# Patient Record
Sex: FEMALE | Race: WHITE | NOT HISPANIC OR LATINO | URBAN - METROPOLITAN AREA
[De-identification: names, ages, dates, MRNs, and addresses within clinical notes are randomized per-mention and may not be internally consistent; named-entity substitution may affect disease eponyms.]

---

## 2023-03-08 ENCOUNTER — TELEPHONE ENCOUNTER (OUTPATIENT)
Dept: URBAN - METROPOLITAN AREA CLINIC 68 | Facility: CLINIC | Age: 73
End: 2023-03-08

## 2023-03-09 ENCOUNTER — OFFICE VISIT (OUTPATIENT)
Dept: URBAN - METROPOLITAN AREA CLINIC 66 | Facility: CLINIC | Age: 73
End: 2023-03-09

## 2023-03-09 RX ORDER — SOD SULF/POT CHLORIDE/MAG SULF 1.479 G
AS DIRECTED TABLET ORAL ONCE
Qty: 1 KIT | OUTPATIENT

## 2023-03-09 RX ORDER — CHOLECALCIFEROL (VITAMIN D3) 50 MCG
1 TABLET TABLET ORAL ONCE A DAY
Status: ACTIVE | COMMUNITY

## 2023-03-09 RX ORDER — MULTIVITAMIN WITH IRON
AS DIRECTED TABLET ORAL
Status: ACTIVE | COMMUNITY

## 2023-03-09 RX ORDER — HYDROCHLOROTHIAZIDE 25 MG/1
1 TABLET IN THE MORNING TABLET ORAL ONCE A DAY
Status: ACTIVE | COMMUNITY

## 2023-03-09 RX ORDER — UBIDECARENONE 200 MG
AS DIRECTED CAPSULE ORAL
Status: ACTIVE | COMMUNITY

## 2023-03-09 RX ORDER — SIMVASTATIN 20 MG
1 TABLET IN THE EVENING TABLET ORAL ONCE A DAY
Status: ACTIVE | COMMUNITY

## 2023-03-09 NOTE — EXAM-MIGRATED EXAMINATIONS
At [facility], one important tool we use to improve our patient services is our Patient Survey.  Following your visit you may receive our survey in the mail.    Please take the time to complete the survey.    If your visit with us was great, we want to hear about it.    If we can improve, please let us know how.          General Examination: Extremities: -> normal extremity with no clubbing, cyanosis or edema   Neurologic: -> alert and oriented   Neck / thyroid: -> neck is supple, with full range of motion and no cervical lymphadenopathy   Skin: -> skin is warm and dry, with no rashes, good skin turgor and normal hair distribution   Heart: -> regular rate and rhythm without murmurs, gallops, clicks or rubs   Lungs: -> clear to auscultation bilaterally, with good air movement and no rales, rhonchi or wheezes   Chest: -> chest wall with no costochondral junction tenderness, no rib deformity and normal shape and expansion   Abdomen: -> soft with good bowel sounds, nontender, and no masses or hepatosplenomegaly , negative Osborne's sign   General appearance: -> alert, pleasant, well-nourished and in no acute distress   Head: -> normocephalic, atraumatic   Eyes: -> normal

## 2023-03-09 NOTE — HPI-MIGRATED HPI
Transition to Care : 72 y.o. WF with history of  A-fib who was previously on Eliquis and did have a colonoscopy in [place] a couple of years ago and had colon polyps. She does have hemorrhoids with episodic hemorrhoidal bleeding and is interested in hemorrhoid banding. Information on [place] hemorrhoid banding was provided. She denies any abdominal pain. She does have an appt. with Cardiologist in April and is recommended to have cardiac clearance. She has stopped the Eliquis due to episodes of rectal bleeding and is aware of risk for CVA from A-fib.

## 2023-04-21 ENCOUNTER — TELEPHONE ENCOUNTER (OUTPATIENT)
Dept: URBAN - METROPOLITAN AREA CLINIC 68 | Facility: CLINIC | Age: 73
End: 2023-04-21

## 2023-04-21 RX ORDER — MULTIVITAMIN WITH IRON
AS DIRECTED TABLET ORAL
COMMUNITY

## 2023-04-21 RX ORDER — SOD SULF/POT CHLORIDE/MAG SULF 1.479 G
AS DIRECTED TABLET ORAL ONCE
Qty: 1 KIT | COMMUNITY

## 2023-04-21 RX ORDER — SIMVASTATIN 20 MG
1 TABLET IN THE EVENING TABLET ORAL ONCE A DAY
COMMUNITY

## 2023-04-21 RX ORDER — SODIUM PICOSULFATE, MAGNESIUM OXIDE, AND ANHYDROUS CITRIC ACID 10; 3.5; 12 MG/160ML; G/160ML; G/160ML
160 ML LIQUID ORAL ONCE
Qty: 1 KIT | OUTPATIENT

## 2023-04-21 RX ORDER — CHOLECALCIFEROL (VITAMIN D3) 50 MCG
1 TABLET TABLET ORAL ONCE A DAY
COMMUNITY

## 2023-04-21 RX ORDER — SODIUM PICOSULFATE, MAGNESIUM OXIDE, AND ANHYDROUS CITRIC ACID 10; 3.5; 12 MG/160ML; G/160ML; G/160ML
160 ML LIQUID ORAL ONCE
Qty: 1 KIT | COMMUNITY

## 2023-04-21 RX ORDER — UBIDECARENONE 200 MG
AS DIRECTED CAPSULE ORAL
COMMUNITY

## 2023-04-21 RX ORDER — HYDROCHLOROTHIAZIDE 25 MG/1
1 TABLET IN THE MORNING TABLET ORAL ONCE A DAY
COMMUNITY

## 2023-04-24 ENCOUNTER — DASHBOARD ENCOUNTERS (OUTPATIENT)
Age: 73
End: 2023-04-24

## 2023-04-24 ENCOUNTER — OFFICE VISIT (OUTPATIENT)
Dept: URBAN - METROPOLITAN AREA SURGERY CENTER 12 | Facility: SURGERY CENTER | Age: 73
End: 2023-04-24

## 2023-04-24 RX ORDER — SIMVASTATIN 20 MG
1 TABLET IN THE EVENING TABLET ORAL ONCE A DAY
COMMUNITY

## 2023-04-24 RX ORDER — CHOLECALCIFEROL (VITAMIN D3) 50 MCG
1 TABLET TABLET ORAL ONCE A DAY
COMMUNITY

## 2023-04-24 RX ORDER — SODIUM PICOSULFATE, MAGNESIUM OXIDE, AND ANHYDROUS CITRIC ACID 10; 3.5; 12 MG/160ML; G/160ML; G/160ML
160 ML LIQUID ORAL ONCE
Qty: 1 KIT | Status: ACTIVE | COMMUNITY

## 2023-04-24 RX ORDER — HYDROCHLOROTHIAZIDE 25 MG/1
1 TABLET IN THE MORNING TABLET ORAL ONCE A DAY
COMMUNITY

## 2023-04-24 RX ORDER — MULTIVITAMIN WITH IRON
AS DIRECTED TABLET ORAL
COMMUNITY

## 2023-04-24 RX ORDER — SOD SULF/POT CHLORIDE/MAG SULF 1.479 G
AS DIRECTED TABLET ORAL ONCE
Qty: 1 KIT | COMMUNITY

## 2023-04-24 RX ORDER — SODIUM PICOSULFATE, MAGNESIUM OXIDE, AND ANHYDROUS CITRIC ACID 10; 3.5; 12 MG/160ML; G/160ML; G/160ML
160 ML LIQUID ORAL ONCE
Qty: 1 KIT | COMMUNITY

## 2023-04-24 RX ORDER — UBIDECARENONE 200 MG
AS DIRECTED CAPSULE ORAL
COMMUNITY